# Patient Record
Sex: MALE | Race: BLACK OR AFRICAN AMERICAN
[De-identification: names, ages, dates, MRNs, and addresses within clinical notes are randomized per-mention and may not be internally consistent; named-entity substitution may affect disease eponyms.]

---

## 2019-04-05 ENCOUNTER — HOSPITAL ENCOUNTER (EMERGENCY)
Dept: HOSPITAL 54 - ER | Age: 34
Discharge: HOME | End: 2019-04-05
Payer: COMMERCIAL

## 2019-04-05 VITALS — SYSTOLIC BLOOD PRESSURE: 129 MMHG | DIASTOLIC BLOOD PRESSURE: 81 MMHG

## 2019-04-05 VITALS — HEIGHT: 72 IN | BODY MASS INDEX: 29.12 KG/M2 | WEIGHT: 215 LBS

## 2019-04-05 DIAGNOSIS — F32.9: ICD-10-CM

## 2019-04-05 DIAGNOSIS — F41.9: ICD-10-CM

## 2019-04-05 DIAGNOSIS — R45.851: Primary | ICD-10-CM

## 2019-04-05 DIAGNOSIS — F17.200: ICD-10-CM

## 2019-04-05 DIAGNOSIS — Z88.8: ICD-10-CM

## 2019-04-05 DIAGNOSIS — F20.9: ICD-10-CM

## 2019-04-05 LAB
ALBUMIN SERPL BCP-MCNC: 3.3 G/DL (ref 3.4–5)
ALP SERPL-CCNC: 70 U/L (ref 46–116)
ALT SERPL W P-5'-P-CCNC: 26 U/L (ref 12–78)
APAP SERPL-MCNC: 0 UG/ML (ref 10–30)
AST SERPL W P-5'-P-CCNC: 20 U/L (ref 15–37)
BASOPHILS # BLD AUTO: 0 /CMM (ref 0–0.2)
BASOPHILS NFR BLD AUTO: 0.6 % (ref 0–2)
BILIRUB DIRECT SERPL-MCNC: 0 MG/DL (ref 0–0.2)
BILIRUB SERPL-MCNC: 0.1 MG/DL (ref 0.2–1)
BUN SERPL-MCNC: 16 MG/DL (ref 7–18)
CALCIUM SERPL-MCNC: 8.6 MG/DL (ref 8.5–10.1)
CHLORIDE SERPL-SCNC: 106 MMOL/L (ref 98–107)
CO2 SERPL-SCNC: 28 MMOL/L (ref 21–32)
CREAT SERPL-MCNC: 1.2 MG/DL (ref 0.6–1.3)
EOSINOPHIL NFR BLD AUTO: 3.5 % (ref 0–6)
ETHANOL SERPL-MCNC: 0 MG/DL (ref 0–0)
GLUCOSE SERPL-MCNC: 101 MG/DL (ref 74–106)
HCT VFR BLD AUTO: 39 % (ref 39–51)
HGB BLD-MCNC: 12.6 G/DL (ref 13.5–17.5)
LYMPHOCYTES NFR BLD AUTO: 2.5 /CMM (ref 0.8–4.8)
LYMPHOCYTES NFR BLD AUTO: 51.9 % (ref 20–44)
MCHC RBC AUTO-ENTMCNC: 33 G/DL (ref 31–36)
MCV RBC AUTO: 77 FL (ref 80–96)
MONOCYTES NFR BLD AUTO: 0.4 /CMM (ref 0.1–1.3)
MONOCYTES NFR BLD AUTO: 7.7 % (ref 2–12)
NEUTROPHILS # BLD AUTO: 1.8 /CMM (ref 1.8–8.9)
NEUTROPHILS NFR BLD AUTO: 36.3 % (ref 43–81)
PH UR STRIP: 6 [PH] (ref 5–8)
PLATELET # BLD AUTO: 283 /CMM (ref 150–450)
POTASSIUM SERPL-SCNC: 3.9 MMOL/L (ref 3.5–5.1)
PROT SERPL-MCNC: 6.5 G/DL (ref 6.4–8.2)
RBC # BLD AUTO: 5.06 MIL/UL (ref 4.5–6)
SALICYLATES SERPL-MCNC: 1.9 MG/DL (ref 2.8–20)
SODIUM SERPL-SCNC: 142 MMOL/L (ref 136–145)
UROBILINOGEN UR STRIP-MCNC: 0.2 EU/DL
WBC NRBC COR # BLD AUTO: 4.9 K/UL (ref 4.3–11)

## 2019-04-05 PROCEDURE — 81001 URINALYSIS AUTO W/SCOPE: CPT

## 2019-04-05 PROCEDURE — G0480 DRUG TEST DEF 1-7 CLASSES: HCPCS

## 2019-04-05 PROCEDURE — 80305 DRUG TEST PRSMV DIR OPT OBS: CPT

## 2019-04-05 PROCEDURE — 80076 HEPATIC FUNCTION PANEL: CPT

## 2019-04-05 PROCEDURE — 80048 BASIC METABOLIC PNL TOTAL CA: CPT

## 2019-04-05 PROCEDURE — 99284 EMERGENCY DEPT VISIT MOD MDM: CPT

## 2019-04-05 PROCEDURE — 85025 COMPLETE CBC W/AUTO DIFF WBC: CPT

## 2019-04-05 PROCEDURE — 80307 DRUG TEST PRSMV CHEM ANLYZR: CPT

## 2019-04-05 PROCEDURE — 80329 ANALGESICS NON-OPIOID 1 OR 2: CPT

## 2019-04-05 PROCEDURE — 36415 COLL VENOUS BLD VENIPUNCTURE: CPT

## 2019-04-05 NOTE — NUR
PT REFUSED TO SIGN DC PAPERWORK, DID NOT RECEIVED DC INSTRUCTION AS WELL AS 
SHELTER PLACEMENT INFORMATION. ART, LISAW NOTIFIED, HE SPOKE WITH THE PATIENT 
BUT PATIENT WALKED OUT OF ED.

## 2019-04-05 NOTE — NUR
PER LCSW TEVIN MCKINNEY, PT HAS BEEN ACCEPTED IN [facility] TO [facility] ([address], [facility] [address])

## 2019-04-05 NOTE — NUR
PT BIB SELF FOR PSYCH EVAL, VS STABLE, SEEN AND EVAL DONE BY DR FERRERA, 
PLACED ON ER BED 11, AWAITING FOR FUTHER EVAL.

## 2019-06-19 ENCOUNTER — HOSPITAL ENCOUNTER (EMERGENCY)
Dept: HOSPITAL 54 - ER | Age: 34
LOS: 1 days | Discharge: HOME | End: 2019-06-20
Payer: COMMERCIAL

## 2019-06-19 VITALS — HEIGHT: 72 IN | WEIGHT: 200 LBS | BODY MASS INDEX: 27.09 KG/M2

## 2019-06-19 DIAGNOSIS — F17.200: ICD-10-CM

## 2019-06-19 DIAGNOSIS — Z59.0: ICD-10-CM

## 2019-06-19 DIAGNOSIS — Z88.8: ICD-10-CM

## 2019-06-19 DIAGNOSIS — R45.851: Primary | ICD-10-CM

## 2019-06-19 LAB
ALBUMIN SERPL BCP-MCNC: 3.5 G/DL (ref 3.4–5)
ALP SERPL-CCNC: 62 U/L (ref 46–116)
ALT SERPL W P-5'-P-CCNC: 30 U/L (ref 12–78)
APAP SERPL-MCNC: < 5 UG/ML (ref 10–30)
APPEARANCE UR: CLEAR
AST SERPL W P-5'-P-CCNC: 26 U/L (ref 15–37)
BASOPHILS # BLD AUTO: 0 /CMM (ref 0–0.2)
BASOPHILS NFR BLD AUTO: 0.8 % (ref 0–2)
BILIRUB DIRECT SERPL-MCNC: 0.1 MG/DL (ref 0–0.2)
BILIRUB SERPL-MCNC: 0.5 MG/DL (ref 0.2–1)
BILIRUB UR QL STRIP: NEGATIVE
BUN SERPL-MCNC: 15 MG/DL (ref 7–18)
CALCIUM SERPL-MCNC: 8.7 MG/DL (ref 8.5–10.1)
CHLORIDE SERPL-SCNC: 105 MMOL/L (ref 98–107)
CO2 SERPL-SCNC: 27 MMOL/L (ref 21–32)
COLOR UR: YELLOW
CREAT SERPL-MCNC: 1.2 MG/DL (ref 0.6–1.3)
EOSINOPHIL NFR BLD AUTO: 3.9 % (ref 0–6)
ETHANOL SERPL-MCNC: < 3 MG/DL (ref 0–0)
GLUCOSE SERPL-MCNC: 135 MG/DL (ref 74–106)
GLUCOSE UR STRIP-MCNC: NEGATIVE MG/DL
HCT VFR BLD AUTO: 39 % (ref 39–51)
HGB BLD-MCNC: 12.9 G/DL (ref 13.5–17.5)
HGB UR QL STRIP: NEGATIVE ERY/UL
KETONES UR STRIP-MCNC: NEGATIVE MG/DL
LEUKOCYTE ESTERASE UR QL STRIP: NEGATIVE
LYMPHOCYTES NFR BLD AUTO: 2.2 /CMM (ref 0.8–4.8)
LYMPHOCYTES NFR BLD AUTO: 49.4 % (ref 20–44)
MCHC RBC AUTO-ENTMCNC: 33 G/DL (ref 31–36)
MCV RBC AUTO: 77 FL (ref 80–96)
MONOCYTES NFR BLD AUTO: 0.3 /CMM (ref 0.1–1.3)
MONOCYTES NFR BLD AUTO: 6.5 % (ref 2–12)
NEUTROPHILS # BLD AUTO: 1.8 /CMM (ref 1.8–8.9)
NEUTROPHILS NFR BLD AUTO: 39.4 % (ref 43–81)
NITRITE UR QL STRIP: NEGATIVE
PH UR STRIP: 6 [PH] (ref 5–8)
PLATELET # BLD AUTO: 286 /CMM (ref 150–450)
POTASSIUM SERPL-SCNC: 3.4 MMOL/L (ref 3.5–5.1)
PROT SERPL-MCNC: 6.7 G/DL (ref 6.4–8.2)
PROT UR QL STRIP: NEGATIVE MG/DL
RBC # BLD AUTO: 5.08 MIL/UL (ref 4.5–6)
SALICYLATES SERPL-MCNC: 1.7 MG/DL (ref 2.8–20)
SODIUM SERPL-SCNC: 142 MMOL/L (ref 136–145)
UROBILINOGEN UR STRIP-MCNC: 1 EU/DL
WBC NRBC COR # BLD AUTO: 4.4 K/UL (ref 4.3–11)

## 2019-06-19 PROCEDURE — 80307 DRUG TEST PRSMV CHEM ANLYZR: CPT

## 2019-06-19 PROCEDURE — 99406 BEHAV CHNG SMOKING 3-10 MIN: CPT

## 2019-06-19 PROCEDURE — 36415 COLL VENOUS BLD VENIPUNCTURE: CPT

## 2019-06-19 PROCEDURE — 80329 ANALGESICS NON-OPIOID 1 OR 2: CPT

## 2019-06-19 PROCEDURE — G0480 DRUG TEST DEF 1-7 CLASSES: HCPCS

## 2019-06-19 PROCEDURE — 80048 BASIC METABOLIC PNL TOTAL CA: CPT

## 2019-06-19 PROCEDURE — 99284 EMERGENCY DEPT VISIT MOD MDM: CPT

## 2019-06-19 PROCEDURE — 85025 COMPLETE CBC W/AUTO DIFF WBC: CPT

## 2019-06-19 PROCEDURE — 80076 HEPATIC FUNCTION PANEL: CPT

## 2019-06-19 PROCEDURE — 80305 DRUG TEST PRSMV DIR OPT OBS: CPT

## 2019-06-19 PROCEDURE — 81001 URINALYSIS AUTO W/SCOPE: CPT

## 2019-06-19 SDOH — ECONOMIC STABILITY - HOUSING INSECURITY: HOMELESSNESS: Z59.0
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
## 2019-06-20 VITALS — SYSTOLIC BLOOD PRESSURE: 132 MMHG | DIASTOLIC BLOOD PRESSURE: 84 MMHG

## 2019-06-20 NOTE — NUR
Social service consult requested by Dr. Romero for homelessness. Pt. is a 33 year old male 
who came to [facility] complaining of suicidal ideations with no plan. Pt. was seen and cleared by 
crisis clinician last night. Pt. told Crisis clinician Guillermo that he was not suicidal but just 
needed a place to stay. [facility] met with pt. bedside alongside ANA MARIA Roberts. Pt. is alert and 
oriented x 4. Pt. appears disheveled and unkempt. Pt. was eating breakfast during the 
assessment. [facility] offered pt. shelter  placement but pt. declined stating he knows where to go. 
SW inquired with pt. where is it he is going to go. Pt. stated he is going to on [address], 
where he goes all the time. SW encouraged pt. to go to [facility] as 
well and link to their services. Pt. said he is familiar with them and will go there too. 
Pt. has had a history of psychiatric admissions. Per crisis clinician Guillermo, [facility] stated he 
has been hospitalized over 60 times in their facilities. Patient tends to be noncompliant. 
[facility] offered pt. list of homeless resources, however pt. declined to accept them. Pt. did 
accept [facility] card. Pt. denies suicidal and homicidal ideations and is medically and 
psychiatrically cleared for discharge.



Homeless Patient Waiver Form was signed by the pt. and placed in pt's chart. No other social 
service needs are requested at this time.

## 2019-06-20 NOTE — NUR
PER PT REQUEST, PROVIDED PT WITH SANDWICH, CRACKERS, AND JUICE. PT RESTING 
COMFORTABLY IN BED. VITAL SIGNS STABLE. WILL CONTINUE TO MONITOR

## 2019-06-20 NOTE — NUR
PT CONTINUES TO SLEEP IN BED W/ RESP EVEN & UNLABORED, BED LOW TO GROUND W/ 
SIDE RAILS UP FOR SAFETY, LIGHTS DIMMED FOR COMFORT.

## 2019-06-20 NOTE — NUR
PT MEDICALLY AND PSYCH CLEARED. AMBULATORY W/ STEADY GAIT. PROVIDED W/ TAPCARD 
FOR TRANSPORTATION AND STATES "I KNOW WHERE IM GOING. DISCHARGE HOME IN STABLE 
CONDITION.

## 2019-06-23 ENCOUNTER — HOSPITAL ENCOUNTER (EMERGENCY)
Dept: HOSPITAL 54 - ER | Age: 34
Discharge: HOME | End: 2019-06-23
Payer: COMMERCIAL

## 2019-06-23 VITALS — WEIGHT: 200 LBS | HEIGHT: 68 IN | BODY MASS INDEX: 30.31 KG/M2

## 2019-06-23 VITALS — SYSTOLIC BLOOD PRESSURE: 110 MMHG | DIASTOLIC BLOOD PRESSURE: 80 MMHG

## 2019-06-23 DIAGNOSIS — F41.9: ICD-10-CM

## 2019-06-23 DIAGNOSIS — F32.9: ICD-10-CM

## 2019-06-23 DIAGNOSIS — F20.9: ICD-10-CM

## 2019-06-23 DIAGNOSIS — Z88.8: ICD-10-CM

## 2019-06-23 DIAGNOSIS — R45.851: Primary | ICD-10-CM

## 2019-06-23 DIAGNOSIS — F15.10: ICD-10-CM

## 2019-06-23 DIAGNOSIS — F12.10: ICD-10-CM

## 2019-06-23 DIAGNOSIS — F29: ICD-10-CM

## 2019-06-23 DIAGNOSIS — F17.200: ICD-10-CM

## 2019-06-23 LAB
ALBUMIN SERPL BCP-MCNC: 3.6 G/DL (ref 3.4–5)
ALP SERPL-CCNC: 62 U/L (ref 46–116)
ALT SERPL W P-5'-P-CCNC: 25 U/L (ref 12–78)
APAP SERPL-MCNC: 0 UG/ML (ref 10–30)
AST SERPL W P-5'-P-CCNC: 27 U/L (ref 15–37)
BASOPHILS # BLD AUTO: 0 /CMM (ref 0–0.2)
BASOPHILS NFR BLD AUTO: 0.6 % (ref 0–2)
BILIRUB DIRECT SERPL-MCNC: 0.2 MG/DL (ref 0–0.2)
BILIRUB SERPL-MCNC: 1.2 MG/DL (ref 0.2–1)
BILIRUB UR QL STRIP: (no result)
BUN SERPL-MCNC: 17 MG/DL (ref 7–18)
CALCIUM SERPL-MCNC: 8.5 MG/DL (ref 8.5–10.1)
CHLORIDE SERPL-SCNC: 99 MMOL/L (ref 98–107)
CO2 SERPL-SCNC: 25 MMOL/L (ref 21–32)
COLOR UR: (no result)
CREAT SERPL-MCNC: 1.2 MG/DL (ref 0.6–1.3)
EOSINOPHIL NFR BLD AUTO: 1.1 % (ref 0–6)
ETHANOL SERPL-MCNC: < 3 MG/DL (ref 0–0)
GLUCOSE SERPL-MCNC: 76 MG/DL (ref 74–106)
HCT VFR BLD AUTO: 37 % (ref 39–51)
HGB BLD-MCNC: 12.3 G/DL (ref 13.5–17.5)
KETONES UR STRIP-MCNC: 15 MG/DL
LYMPHOCYTES NFR BLD AUTO: 1 /CMM (ref 0.8–4.8)
LYMPHOCYTES NFR BLD AUTO: 15.4 % (ref 20–44)
MCHC RBC AUTO-ENTMCNC: 34 G/DL (ref 31–36)
MCV RBC AUTO: 77 FL (ref 80–96)
MONOCYTES NFR BLD AUTO: 0.7 /CMM (ref 0.1–1.3)
MONOCYTES NFR BLD AUTO: 11.3 % (ref 2–12)
NEUTROPHILS # BLD AUTO: 4.5 /CMM (ref 1.8–8.9)
NEUTROPHILS NFR BLD AUTO: 71.6 % (ref 43–81)
PH UR STRIP: 5.5 [PH] (ref 5–8)
PLATELET # BLD AUTO: 204 /CMM (ref 150–450)
POTASSIUM SERPL-SCNC: 3.7 MMOL/L (ref 3.5–5.1)
PROT SERPL-MCNC: 6.9 G/DL (ref 6.4–8.2)
PROT UR QL STRIP: 30 MG/DL
RBC # BLD AUTO: 4.77 MIL/UL (ref 4.5–6)
RBC #/AREA URNS HPF: (no result) /HPF (ref 0–2)
SALICYLATES SERPL-MCNC: 1.4 MG/DL (ref 2.8–20)
SODIUM SERPL-SCNC: 134 MMOL/L (ref 136–145)
UROBILINOGEN UR STRIP-MCNC: 0.2 EU/DL
WBC #/AREA URNS HPF: (no result) /HPF (ref 0–3)
WBC NRBC COR # BLD AUTO: 6.2 K/UL (ref 4.3–11)

## 2019-06-23 PROCEDURE — G0480 DRUG TEST DEF 1-7 CLASSES: HCPCS
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
## 2020-01-26 NOTE — NUR
PT CAME TO ER BED 11 C/O HEADACHE 4x DAYS. PT STATES THAT HE HAS NECK PAIN AS 
WELL. AAOX4. NO SOB. BREATHING EVENLY ON ROOM AIR. AMBULATORY WITH STEADY GAIT.